# Patient Record
Sex: MALE | Race: WHITE
[De-identification: names, ages, dates, MRNs, and addresses within clinical notes are randomized per-mention and may not be internally consistent; named-entity substitution may affect disease eponyms.]

---

## 2019-10-03 ENCOUNTER — HOSPITAL ENCOUNTER (EMERGENCY)
Dept: HOSPITAL 76 - ED | Age: 24
Discharge: HOME | End: 2019-10-03
Payer: SELF-PAY

## 2019-10-03 ENCOUNTER — HOSPITAL ENCOUNTER (OUTPATIENT)
Dept: HOSPITAL 76 - EMS | Age: 24
Discharge: ANOTHER HOSPITAL | End: 2019-10-03
Attending: SURGERY
Payer: SELF-PAY

## 2019-10-03 VITALS — SYSTOLIC BLOOD PRESSURE: 101 MMHG | DIASTOLIC BLOOD PRESSURE: 73 MMHG

## 2019-10-03 DIAGNOSIS — M54.9: ICD-10-CM

## 2019-10-03 DIAGNOSIS — G89.29: ICD-10-CM
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
## 2019-10-03 DIAGNOSIS — T40.2X1A: Primary | ICD-10-CM

## 2019-10-03 DIAGNOSIS — R40.20: Primary | ICD-10-CM

## 2019-10-03 LAB
ALBUMIN DIAFP-MCNC: 4 G/DL (ref 3.2–5.5)
ALBUMIN/GLOB SERPL: 1.4 {RATIO} (ref 1–2.2)
ALP SERPL-CCNC: 59 IU/L (ref 42–121)
ALT SERPL W P-5'-P-CCNC: 56 IU/L (ref 10–60)
ANION GAP SERPL CALCULATED.4IONS-SCNC: 8 MMOL/L (ref 6–13)
AST SERPL W P-5'-P-CCNC: 55 IU/L (ref 10–42)
BASOPHILS NFR BLD AUTO: 0 10^3/UL (ref 0–0.1)
BASOPHILS NFR BLD AUTO: 0.4 %
BILIRUB BLD-MCNC: 0.5 MG/DL (ref 0.2–1)
BUN SERPL-MCNC: 20 MG/DL (ref 6–20)
CALCIUM UR-MCNC: 8.7 MG/DL (ref 8.5–10.3)
CHLORIDE SERPL-SCNC: 105 MMOL/L (ref 101–111)
CO2 SERPL-SCNC: 27 MMOL/L (ref 21–32)
CREAT SERPLBLD-SCNC: 1 MG/DL (ref 0.6–1.2)
EOSINOPHIL # BLD AUTO: 0.1 10^3/UL (ref 0–0.7)
EOSINOPHIL NFR BLD AUTO: 1 %
ERYTHROCYTE [DISTWIDTH] IN BLOOD BY AUTOMATED COUNT: 12.3 % (ref 12–15)
GFRSERPLBLD MDRD-ARVRAT: 92 ML/MIN/{1.73_M2} (ref 89–?)
GLOBULIN SER-MCNC: 2.9 G/DL (ref 2.1–4.2)
GLUCOSE SERPL-MCNC: 112 MG/DL (ref 70–100)
HGB UR QL STRIP: 13.2 G/DL (ref 14–18)
LIPASE SERPL-CCNC: 28 U/L (ref 22–51)
LYMPHOCYTES # SPEC AUTO: 1.7 10^3/UL (ref 1.5–3.5)
LYMPHOCYTES NFR BLD AUTO: 18.5 %
MCH RBC QN AUTO: 30.5 PG (ref 27–31)
MCHC RBC AUTO-ENTMCNC: 33.6 G/DL (ref 32–36)
MCV RBC AUTO: 90.8 FL (ref 80–94)
MONOCYTES # BLD AUTO: 0.5 10^3/UL (ref 0–1)
MONOCYTES NFR BLD AUTO: 5.1 %
NEUTROPHILS # BLD AUTO: 6.8 10^3/UL (ref 1.5–6.6)
NEUTROPHILS # SNV AUTO: 9.2 X10^3/UL (ref 4.8–10.8)
NEUTROPHILS NFR BLD AUTO: 74.2 %
PDW BLD AUTO: 9 FL (ref 7.4–11.4)
PLATELET # BLD: 306 10^3/UL (ref 130–450)
PROT SPEC-MCNC: 6.9 G/DL (ref 6.7–8.2)
RBC MAR: 4.33 10^6/UL (ref 4.7–6.1)
SODIUM SERPLBLD-SCNC: 140 MMOL/L (ref 135–145)

## 2019-10-03 PROCEDURE — 96360 HYDRATION IV INFUSION INIT: CPT

## 2019-10-03 PROCEDURE — 85025 COMPLETE CBC W/AUTO DIFF WBC: CPT

## 2019-10-03 PROCEDURE — 83690 ASSAY OF LIPASE: CPT

## 2019-10-03 PROCEDURE — 93005 ELECTROCARDIOGRAM TRACING: CPT

## 2019-10-03 PROCEDURE — 99284 EMERGENCY DEPT VISIT MOD MDM: CPT

## 2019-10-03 PROCEDURE — 71046 X-RAY EXAM CHEST 2 VIEWS: CPT

## 2019-10-03 PROCEDURE — 36415 COLL VENOUS BLD VENIPUNCTURE: CPT

## 2019-10-03 PROCEDURE — 51798 US URINE CAPACITY MEASURE: CPT

## 2019-10-03 PROCEDURE — 80053 COMPREHEN METABOLIC PANEL: CPT

## 2019-10-03 NOTE — XRAY REPORT
Reason:  CPR, assess for pneumo, fx

Procedure Date:  10/03/2019   

Accession Number:  [id number] / [id number]                    

Procedure:  XR  - Chest 2 View X-Ray CPT Code:  93106

 

FULL RESULT:

 

 

EXAM:

CHEST RADIOGRAPHY

 

EXAM DATE: 10/3/2019 10:00 AM.

 

CLINICAL HISTORY: CPR, assess for pneumo, fx.

 

COMPARISON: None.

 

TECHNIQUE: 2 views.

 

FINDINGS:

Lungs/Pleura: No focal opacities evident. No pleural effusion. No 

pneumothorax. Normal volumes.

 

Mediastinum: Heart and mediastinal contours are unremarkable.

 

Other: None.

IMPRESSION:

1. Normal 2-view chest radiography.

2. No pneumothorax or rib fracture evident.

 

RADIA

## 2019-10-03 NOTE — ED PHYSICIAN DOCUMENTATION
History of Present Illness





- Stated complaint


Stated Complaint: OD





- Chief complaint


Chief Complaint: General





- Additonal information


Additional information: 


This is a 24-year-old male with a history of back pain after compression 

fractures last year, who presents After an accidental overdose.  Patient states 

that this morning he took a 1/2 tablet of 30 mg Percocet that his friend bought 

off the street, he took this for his chronic back pain, and about 30 minutes 

prior to his arrival here he was in a work truck with his Colleague, when he 

suddenly slumped over.  His colleague did not feel pulses so he pulled him out 

of the truck and began CPR and EMS was called.  When EMS arrived he had received

CPR for around 9 minutes, he was found to have agonal respirations and palpable 

pulses, he had miotic pupils.  He was given 2 mg of Narcan and immediately 

awoke.  He had some vomiting she also received 4 mg of Zofran.  Patient is 

alert, oriented, he has some chest soreness, and feels slightly dizzy, but 

denies other complaints. He denies taking any other drugs. He denies any intent 

to harm himself.








Review of Systems


Constitutional: denies: Fever


Throat: denies: Oral lesions / sores


Cardiac: reports: Other (CHest soreness)


Respiratory: denies: Dyspnea


GI: reports: Nausea, Vomiting


: denies: Dysuria


Skin: denies: Rash


Neurologic: denies: Focal weakness


Psychiatric: denies: Suicidal





PD PAST MEDICAL HISTORY





- Past Medical History


Musculoskeletal: Other (Compression fractures of spine)





- Allergies


Allergies/Adverse Reactions: 


                                    Allergies











Allergy/AdvReac Type Severity Reaction Status Date / Time


 


No Known Drug Allergies Allergy   Verified 10/03/19 09:32














- Living Situation


Living Arrangement: reports: At home





- Social History


Substance Use and Type: Prescription Pills





- Family History


Family history: reports: Non contributory





PD ED PE NORMAL





- Vitals


Vital signs reviewed: Yes





- General


General: Alert and oriented X 3, No acute distress





- HEENT


HEENT: Atraumatic, Other (Pupils 2mm and reactive bilaterally)





- Neck


Neck: Supple, no meningeal sign





- Cardiac


Cardiac: RRR





- Respiratory


Respiratory: No respiratory distress, Clear bilaterally





- Abdomen


Abdomen: Soft, Non tender, Non distended





- Derm


Derm: Warm and dry





- Extremities


Extremities: No deformity





- Neuro


Neuro: Alert and oriented X 3, CNs 2-12 intact, No motor deficit, No sensory 

deficit, Normal speech





- Psych


Psych: Normal mood, Normal affect





Results





- Vitals


Vitals: 


                               Vital Signs - 24 hr











  10/03/19 10/03/19 10/03/19





  09:32 10:33 11:22


 


Temperature 36.2 C L  


 


Heart Rate 69 80 65


 


Respiratory 15 16 17





Rate   


 


Blood Pressure 114/84 H 129/88 H 114/70


 


O2 Saturation 99 97 100














  10/03/19 10/03/19





  11:59 13:26


 


Temperature  96.6 C H


 


Heart Rate 78 72


 


Respiratory 16 14





Rate  


 


Blood Pressure 127/78 101/73


 


O2 Saturation 95 99








                                     Oxygen











O2 Source                      Room air

















- EKG (time done)


  ** 9:58


Other comments: Other comments (Rate 70, rhythm sinus, axis normal.  There are 

no ST segment elevations or depressions, no abnormal T wave inversions.  

Intervals within normal limits.)





- Labs


Labs: 


                                Laboratory Tests











  10/03/19 10/03/19





  09:46 09:46


 


WBC  9.2 


 


RBC  4.33 L 


 


Hgb  13.2 L 


 


Hct  39.3 L 


 


MCV  90.8 


 


MCH  30.5 


 


MCHC  33.6 


 


RDW  12.3 


 


Plt Count  306 


 


MPV  9.0 


 


Neut # (Auto)  6.8 H 


 


Lymph # (Auto)  1.7 


 


Mono # (Auto)  0.5 


 


Eos # (Auto)  0.1 


 


Baso # (Auto)  0.0 


 


Absolute Nucleated RBC  0.00 


 


Nucleated RBC %  0.0 


 


Sodium   140


 


Potassium   3.9


 


Chloride   105


 


Carbon Dioxide   27


 


Anion Gap   8.0


 


BUN   20


 


Creatinine   1.0


 


Estimated GFR (MDRD)   92


 


Glucose   112 H


 


Calcium   8.7


 


Total Bilirubin   0.5


 


AST   55 H


 


ALT   56


 


Alkaline Phosphatase   59


 


Total Protein   6.9


 


Albumin   4.0


 


Globulin   2.9


 


Albumin/Globulin Ratio   1.4


 


Lipase   28














- Rads (name of study)


  ** CXR


Radiology: Other (No acute cardiopulmonary abnormality)





PD MEDICAL DECISION MAKING





- ED course


Complexity details: considered differential (Opioid overdose, dysrhtymia, rib 

fracture, aspiration, pneumothorax)


ED course: 


On arrival patient is alert, oriented, with no neurologic deficits and 

saturating in the high 90s on room air. He has some mild redness on his sternum 

without crepitus. CXR shows no pneumothorax or displaced fractures. EKG is 

unremarkable. Labs unrevealing other than mildly elevated AST - patient has no 

abdominal tenderness. This may be from alcohol use.





Pt was observed for over 3 hours, remained awake, without hypoxia or somnolence.

His chest discomfort was minimal and patient was able to urinate, ambulate, and 

felt very well. I discussed the risk of opioid use, particularly pills/drugs 

bought on the street which have varying strengths. I recommended he abstain from

all drug use. Patient is appreciative of the care he received,, receptive to our

counseling, and seems appropriately disturbed by his accidental overdose. I 

discussed return precuations and patient was discharged home in the care of his 

family.





Departure





- Departure


Disposition: 01 Home, Self Care


Clinical Impression: 


Narcotic overdose


Qualifiers:


 Encounter type: initial encounter Injury intent: accidental or unintentional 

Qualified Code(s): T40.601A - Poisoning by unspecified narcotics, accidental 

(unintentional), initial encounter





Condition: Good


Instructions:  ED Overdose Opiate


Comments: 


You were seen today after an accidental overdose of narcotic medications.  

Please do not take any narcotics that are not prescribed to you.  If you develop

any sleepiness or confusion, return to the emergency department immediately.  

Make sure that someone is watching you at home for the next day.  Follow-up with

your primary care provider soon as possible.


Discharge Date/Time: 10/03/19 13:27

## 2020-02-21 ENCOUNTER — HOSPITAL ENCOUNTER (OUTPATIENT)
Dept: HOSPITAL 76 - EMS | Age: 25
Discharge: ANOTHER HOSPITAL | End: 2020-02-21
Attending: SURGERY
Payer: COMMERCIAL

## 2020-02-21 DIAGNOSIS — S31.119A: Primary | ICD-10-CM
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
## 2023-03-12 ENCOUNTER — HOSPITAL ENCOUNTER (EMERGENCY)
Dept: HOSPITAL 76 - ED | Age: 28
Discharge: HOME | End: 2023-03-12
Payer: COMMERCIAL

## 2023-03-12 VITALS — SYSTOLIC BLOOD PRESSURE: 135 MMHG | DIASTOLIC BLOOD PRESSURE: 70 MMHG
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
## 2023-03-12 DIAGNOSIS — S61.451A: Primary | ICD-10-CM

## 2023-03-12 PROCEDURE — 99283 EMERGENCY DEPT VISIT LOW MDM: CPT

## 2023-03-12 PROCEDURE — 12001 RPR S/N/AX/GEN/TRNK 2.5CM/<: CPT

## 2023-03-12 PROCEDURE — 73130 X-RAY EXAM OF HAND: CPT

## 2023-03-12 NOTE — XRAY REPORT
PROCEDURE:  Hand 3 View RT

 

INDICATIONS:  HAND INJ

 

TECHNIQUE:  3 views of the hand(s) acquired.  

 

COMPARISON:  None

 

FINDINGS:  

 

Bones:  No fractures or dislocations.  No suspicious bony lesions.  

 

Soft tissues:  No suspicious soft tissue calcifications.  

 

IMPRESSION:  No acute abnormality of the right hand

 

 

Reviewed by: Murtaza Cast on 3/12/2023 2:10 PM [email or web address]

Approved by: Murtaza Cast on 3/12/2023 2:10 PM AKDT

 

 

Station ID:  [id number]

## 2023-03-12 NOTE — ED PHYSICIAN DOCUMENTATION
PD HPI WOUND RECHECK





- Stated complaint


Stated Complaint: DOG BITE RT HAND





- Chief complaint


Chief Complaint: Wound





- Histroy obtained from


History obtained from: Patient (27-year-old right-handed gentleman who is 

up-to-date on tetanus was helping in a friend's yard and the friend's dog bit 

him on the right hand just prior to arrival.  No other injuries.)





PD PAST MEDICAL HISTORY





- Past Medical History


Musculoskeletal: Other (Compression fractures of spine)





- Present Medications


Home Medications: 


                                Ambulatory Orders











 Medication  Instructions  Recorded  Confirmed


 


Amox/Clav 875/125 [Augmentin] 1 each PO Q12H #10 tablet 03/12/23 














- Allergies


Allergies/Adverse Reactions: 


                                    Allergies











Allergy/AdvReac Type Severity Reaction Status Date / Time


 


No Known Drug Allergies Allergy   Verified 03/12/23 14:00














PD ED PE NORMAL





- Vitals


Vital signs reviewed: Yes





- General


General: Alert and oriented X 3, No acute distress





- Extremities


Extremities: Other (SEE MDM -TEXT BOX TOO SMALL)





- Neuro


Neuro: Alert and oriented X 3





Results





- Vitals


Vitals: 


                               Vital Signs - 24 hr











  03/12/23





  13:56


 


Temperature 36.1 C L


 


Heart Rate 82


 


Respiratory 16





Rate 


 


Blood Pressure 135/70 H


 


O2 Saturation 95








                                     Oxygen











O2 Source                      Room air

















- Rads (name of study)


  ** Three-view x-ray of the right hand is unremarkable.


Relevant Findings:: Final report received, EMP independent interpretation of 

test





Procedures





- Laceration (location)


  ** Right palm


Length in cm: 1.5


Wound type: Linear, Into subcut fat


Neurovascular status: Sensory intact, Motor intact, Vascular intact


Tendon involvement: Tendon intact


Anesthesia: Lidocaine 1%, With bicarb


Wound preparation: Chlorhexadine, Irrigated copiously NS


Skin layer closure: Nylon, Interrupted, Size #-0 - enter number (4-0), Sutures -

 enter # (2)


Other: Tetanus UTD





PD Medical Decision Making





- ED course


ED course: 





RIGHT HAND EXAM:


There are couple very small puncture wounds on the dorsum of the hand.  He is 

tender at the tip of the fourth finger with a small subungual hematoma and a 

blood blister on the tuft.  There is couple more puncture wounds on the palm and

 then a larger laceration just proximal to the fourth finger.  No distal 

neurovascular compromise of any digit and flexor and extensor tendon function is

 intact in all digits.





Departure





- Departure


Disposition: 01 Home, Self Care


Clinical Impression: 


 Dog bite of right hand





Condition: Good


Record reviewed to determine appropriate education?: Yes


Instructions:  ED Bite Animal General, ED Laceration Hand


Prescriptions: 


Amox/Clav 875/125 [Augmentin] 1 each PO Q12H #10 tablet


Comments: 


Come back for any signs of infection which would include: Redness, swelling, 

drainage, increased pain, or fevers.


You can wash it soap and water. Keep it covered and moist with bacitracin 

ointment which is available over the counter; avoid neosporin.


Follow-up with your physician in About 14 days for suture removal.


Discharge Date/Time: 03/12/23 15:37